# Patient Record
Sex: MALE | Race: WHITE | HISPANIC OR LATINO | URBAN - METROPOLITAN AREA
[De-identification: names, ages, dates, MRNs, and addresses within clinical notes are randomized per-mention and may not be internally consistent; named-entity substitution may affect disease eponyms.]

---

## 2018-11-19 ENCOUNTER — EMERGENCY (EMERGENCY)
Facility: HOSPITAL | Age: 23
LOS: 1 days | Discharge: HOME | End: 2018-11-19
Attending: EMERGENCY MEDICINE
Payer: COMMERCIAL

## 2018-11-19 VITALS
RESPIRATION RATE: 16 BRPM | WEIGHT: 154.98 LBS | DIASTOLIC BLOOD PRESSURE: 76 MMHG | SYSTOLIC BLOOD PRESSURE: 128 MMHG | HEART RATE: 81 BPM | TEMPERATURE: 98 F | OXYGEN SATURATION: 100 % | HEIGHT: 70 IN

## 2018-11-19 VITALS
HEART RATE: 74 BPM | SYSTOLIC BLOOD PRESSURE: 125 MMHG | RESPIRATION RATE: 18 BRPM | DIASTOLIC BLOOD PRESSURE: 82 MMHG | OXYGEN SATURATION: 99 % | TEMPERATURE: 98 F

## 2018-11-19 PROBLEM — Z00.00 ENCOUNTER FOR PREVENTIVE HEALTH EXAMINATION: Status: ACTIVE | Noted: 2018-11-19

## 2018-11-19 LAB
ALBUMIN SERPL ELPH-MCNC: 4.5 G/DL — SIGNIFICANT CHANGE UP (ref 3.3–5)
ALP SERPL-CCNC: 77 U/L — SIGNIFICANT CHANGE UP (ref 40–120)
ALT FLD-CCNC: 23 U/L — SIGNIFICANT CHANGE UP (ref 12–78)
ANION GAP SERPL CALC-SCNC: 5 MMOL/L — SIGNIFICANT CHANGE UP (ref 5–17)
AST SERPL-CCNC: 14 U/L — LOW (ref 15–37)
BASOPHILS # BLD AUTO: 0.03 K/UL — SIGNIFICANT CHANGE UP (ref 0–0.2)
BASOPHILS NFR BLD AUTO: 0.5 % — SIGNIFICANT CHANGE UP (ref 0–2)
BILIRUB SERPL-MCNC: 1.1 MG/DL — SIGNIFICANT CHANGE UP (ref 0.2–1.2)
BUN SERPL-MCNC: 16 MG/DL — SIGNIFICANT CHANGE UP (ref 7–23)
CALCIUM SERPL-MCNC: 8.8 MG/DL — SIGNIFICANT CHANGE UP (ref 8.5–10.1)
CHLORIDE SERPL-SCNC: 105 MMOL/L — SIGNIFICANT CHANGE UP (ref 96–108)
CO2 SERPL-SCNC: 29 MMOL/L — SIGNIFICANT CHANGE UP (ref 22–31)
CREAT SERPL-MCNC: 1.1 MG/DL — SIGNIFICANT CHANGE UP (ref 0.5–1.3)
D DIMER BLD IA.RAPID-MCNC: <150 NG/ML DDU — SIGNIFICANT CHANGE UP
EOSINOPHIL # BLD AUTO: 0.06 K/UL — SIGNIFICANT CHANGE UP (ref 0–0.5)
EOSINOPHIL NFR BLD AUTO: 1.1 % — SIGNIFICANT CHANGE UP (ref 0–6)
GLUCOSE SERPL-MCNC: 78 MG/DL — SIGNIFICANT CHANGE UP (ref 70–99)
HCT VFR BLD CALC: 43.5 % — SIGNIFICANT CHANGE UP (ref 39–50)
HGB BLD-MCNC: 14.6 G/DL — SIGNIFICANT CHANGE UP (ref 13–17)
IMM GRANULOCYTES NFR BLD AUTO: 0.2 % — SIGNIFICANT CHANGE UP (ref 0–1.5)
LYMPHOCYTES # BLD AUTO: 1.73 K/UL — SIGNIFICANT CHANGE UP (ref 1–3.3)
LYMPHOCYTES # BLD AUTO: 31.1 % — SIGNIFICANT CHANGE UP (ref 13–44)
MCHC RBC-ENTMCNC: 28.1 PG — SIGNIFICANT CHANGE UP (ref 27–34)
MCHC RBC-ENTMCNC: 33.6 GM/DL — SIGNIFICANT CHANGE UP (ref 32–36)
MCV RBC AUTO: 83.8 FL — SIGNIFICANT CHANGE UP (ref 80–100)
MONOCYTES # BLD AUTO: 0.46 K/UL — SIGNIFICANT CHANGE UP (ref 0–0.9)
MONOCYTES NFR BLD AUTO: 8.3 % — SIGNIFICANT CHANGE UP (ref 2–14)
NEUTROPHILS # BLD AUTO: 3.27 K/UL — SIGNIFICANT CHANGE UP (ref 1.8–7.4)
NEUTROPHILS NFR BLD AUTO: 58.8 % — SIGNIFICANT CHANGE UP (ref 43–77)
PLATELET # BLD AUTO: 180 K/UL — SIGNIFICANT CHANGE UP (ref 150–400)
POTASSIUM SERPL-MCNC: 4.5 MMOL/L — SIGNIFICANT CHANGE UP (ref 3.5–5.3)
POTASSIUM SERPL-SCNC: 4.5 MMOL/L — SIGNIFICANT CHANGE UP (ref 3.5–5.3)
PROT SERPL-MCNC: 7.9 G/DL — SIGNIFICANT CHANGE UP (ref 6–8.3)
RBC # BLD: 5.19 M/UL — SIGNIFICANT CHANGE UP (ref 4.2–5.8)
RBC # FLD: 12.6 % — SIGNIFICANT CHANGE UP (ref 10.3–14.5)
SODIUM SERPL-SCNC: 139 MMOL/L — SIGNIFICANT CHANGE UP (ref 135–145)
TROPONIN I SERPL-MCNC: <.015 NG/ML — SIGNIFICANT CHANGE UP (ref 0.01–0.04)
WBC # BLD: 5.56 K/UL — SIGNIFICANT CHANGE UP (ref 3.8–10.5)
WBC # FLD AUTO: 5.56 K/UL — SIGNIFICANT CHANGE UP (ref 3.8–10.5)

## 2018-11-19 PROCEDURE — 93005 ELECTROCARDIOGRAM TRACING: CPT

## 2018-11-19 PROCEDURE — 99283 EMERGENCY DEPT VISIT LOW MDM: CPT | Mod: 25

## 2018-11-19 PROCEDURE — 85379 FIBRIN DEGRADATION QUANT: CPT

## 2018-11-19 PROCEDURE — 99284 EMERGENCY DEPT VISIT MOD MDM: CPT

## 2018-11-19 PROCEDURE — 71046 X-RAY EXAM CHEST 2 VIEWS: CPT | Mod: 26

## 2018-11-19 PROCEDURE — 93010 ELECTROCARDIOGRAM REPORT: CPT

## 2018-11-19 PROCEDURE — 36415 COLL VENOUS BLD VENIPUNCTURE: CPT

## 2018-11-19 PROCEDURE — 80053 COMPREHEN METABOLIC PANEL: CPT

## 2018-11-19 PROCEDURE — 85027 COMPLETE CBC AUTOMATED: CPT

## 2018-11-19 PROCEDURE — 71046 X-RAY EXAM CHEST 2 VIEWS: CPT

## 2018-11-19 PROCEDURE — 84484 ASSAY OF TROPONIN QUANT: CPT

## 2018-11-19 NOTE — ED ADULT NURSE NOTE - BREATH SOUNDS, MLM
Constitutional:  see HPI  Head:  no headache, dizziness, loss of consciousness  Eyes:  no visual changes; no eye pain, redness, or discharge  ENMT:  no ear pain or discharge; no hearing problems; no mouth or throat sores or lesions; no throat pain  Cardiac: no chest pain, tachycardia or palpitations  Respiratory: no cough, wheezing, shortness of breath, chest tightness, or trouble breathing  GI: no nausea, vomiting, diarrhea + abdominal pain  :  no dysuria, frequency, or burning with urination; no change in urine output  MS: no myalgias, muscle weakness, joint pain,or  injury; no joint swelling  Neuro: no weakness; no numbness or tingling; no seizure  Skin:  no rashes or color changes; no lacerations or abrasions Clear

## 2018-11-19 NOTE — ED PROVIDER NOTE - ATTENDING CONTRIBUTION TO CARE
24 yo male no pmhx c/o left upper chest pain x several months that has been getting worse over past 2 weeks, 5/10, worse at night.  No fever/chills.  No other complaints    Gen: Alert, NAD  Head/eyes: NC/AT, PERRL, EOMI, normal lids/conjunctiva, no scleral icterus  ENT: Bilateral TM WNL, normal hearing, patent oropharynx without erythema/exudate, uvula midline, no peritonsillar abscess, no tongue/uvula swelling  Neck: supple, no tenderness/meningismus/JVD, Trachea midline  Pulm: Bilateral clear BS, normal resp effort, no wheeze/stridor/retractions  CV: RRR, no M/R/G, +2 dist pulses (radial, pedal DP/PT, popliteal)  Abd: soft, NT/ND, +BS, no guarding/rebound tenderness  Musculoskeletal: no edema/erythema/cyanosis, FROM in all extremities, no C/T/L spine ttp  Skin: no rash, no vesicles, no petechaie, no ecchymosis, no swelling  Neuro: AAOx3, CN 2-12 intact, normal sensation, 5/5 motor strength in all extremities, normal gait, no dysmetria     labs WNL, trop negative, dimer negative, XR negative, will f/u with PMD Dr. Garcia and outpt cardiology

## 2018-11-19 NOTE — ED ADULT NURSE NOTE - OBJECTIVE STATEMENT
Pt. received alert and oriented x4 with chief complaint of left chest pain for 2 weeks worsening and within last 2 days new onset of left sided facial pain radiating down left arm. Pt. denies any SOB/dizziness/blurred vision.

## 2018-11-19 NOTE — ED PROVIDER NOTE - OBJECTIVE STATEMENT
23 yr old no pmhx presents with complaint of cp x 2 weeks. CP feels like left upper chest, 5/10, worse at night when lays down and lasts for 30min to 1 hour, and last night he saw a vein pulsating over his left pectus, pain radiates and feels like tingling in his arm and tightness in his neck. He has not tried any OTC medications for pain. Pt has never felt anything like this before. Has had a muscle pull in the past and this feels different. Has not been weight lifting recently. No recent illnesses. Denies fevers, chills, SOB, SHABAZZ, nausea/vomiting, abdominal pain, does not change with position.

## 2020-01-06 ENCOUNTER — TRANSCRIPTION ENCOUNTER (OUTPATIENT)
Age: 25
End: 2020-01-06